# Patient Record
Sex: FEMALE | Race: WHITE | NOT HISPANIC OR LATINO
[De-identification: names, ages, dates, MRNs, and addresses within clinical notes are randomized per-mention and may not be internally consistent; named-entity substitution may affect disease eponyms.]

---

## 2017-01-17 ENCOUNTER — APPOINTMENT (OUTPATIENT)
Dept: ENDOCRINOLOGY | Facility: CLINIC | Age: 52
End: 2017-01-17

## 2017-02-16 ENCOUNTER — MEDICATION RENEWAL (OUTPATIENT)
Age: 52
End: 2017-02-16

## 2017-03-01 ENCOUNTER — MEDICATION RENEWAL (OUTPATIENT)
Age: 52
End: 2017-03-01

## 2017-04-01 ENCOUNTER — TRANSCRIPTION ENCOUNTER (OUTPATIENT)
Age: 52
End: 2017-04-01

## 2017-05-31 ENCOUNTER — MEDICATION RENEWAL (OUTPATIENT)
Age: 52
End: 2017-05-31

## 2017-06-23 ENCOUNTER — APPOINTMENT (OUTPATIENT)
Dept: ENDOCRINOLOGY | Facility: CLINIC | Age: 52
End: 2017-06-23

## 2017-07-21 ENCOUNTER — APPOINTMENT (OUTPATIENT)
Dept: ENDOCRINOLOGY | Facility: CLINIC | Age: 52
End: 2017-07-21

## 2017-10-06 ENCOUNTER — APPOINTMENT (OUTPATIENT)
Dept: ULTRASOUND IMAGING | Facility: CLINIC | Age: 52
End: 2017-10-06
Payer: COMMERCIAL

## 2017-10-06 ENCOUNTER — OUTPATIENT (OUTPATIENT)
Dept: OUTPATIENT SERVICES | Facility: HOSPITAL | Age: 52
LOS: 1 days | End: 2017-10-06
Payer: COMMERCIAL

## 2017-10-06 DIAGNOSIS — Z00.8 ENCOUNTER FOR OTHER GENERAL EXAMINATION: ICD-10-CM

## 2017-10-06 PROCEDURE — 76856 US EXAM PELVIC COMPLETE: CPT

## 2017-10-06 PROCEDURE — 76830 TRANSVAGINAL US NON-OB: CPT | Mod: 26

## 2017-10-06 PROCEDURE — 76856 US EXAM PELVIC COMPLETE: CPT | Mod: 26

## 2017-10-06 PROCEDURE — 76700 US EXAM ABDOM COMPLETE: CPT | Mod: 26

## 2017-10-06 PROCEDURE — 76700 US EXAM ABDOM COMPLETE: CPT

## 2017-10-06 PROCEDURE — 76830 TRANSVAGINAL US NON-OB: CPT

## 2017-10-12 ENCOUNTER — APPOINTMENT (OUTPATIENT)
Dept: ENDOCRINOLOGY | Facility: CLINIC | Age: 52
End: 2017-10-12

## 2017-11-05 ENCOUNTER — EMERGENCY (EMERGENCY)
Facility: HOSPITAL | Age: 52
LOS: 1 days | Discharge: HOME | End: 2017-11-05
Attending: EMERGENCY MEDICINE | Admitting: EMERGENCY MEDICINE
Payer: COMMERCIAL

## 2017-11-05 VITALS
DIASTOLIC BLOOD PRESSURE: 81 MMHG | OXYGEN SATURATION: 100 % | TEMPERATURE: 98 F | RESPIRATION RATE: 18 BRPM | SYSTOLIC BLOOD PRESSURE: 121 MMHG | HEART RATE: 65 BPM

## 2017-11-05 VITALS
DIASTOLIC BLOOD PRESSURE: 61 MMHG | RESPIRATION RATE: 20 BRPM | SYSTOLIC BLOOD PRESSURE: 111 MMHG | TEMPERATURE: 98 F | OXYGEN SATURATION: 100 % | HEART RATE: 74 BPM

## 2017-11-05 DIAGNOSIS — Z98.891 HISTORY OF UTERINE SCAR FROM PREVIOUS SURGERY: Chronic | ICD-10-CM

## 2017-11-05 LAB
ALBUMIN SERPL ELPH-MCNC: 4.5 G/DL — SIGNIFICANT CHANGE UP (ref 3.3–5)
ALP SERPL-CCNC: 75 U/L — SIGNIFICANT CHANGE UP (ref 40–120)
ALT FLD-CCNC: 21 U/L RC — SIGNIFICANT CHANGE UP (ref 10–45)
ANION GAP SERPL CALC-SCNC: 13 MMOL/L — SIGNIFICANT CHANGE UP (ref 5–17)
APPEARANCE UR: CLEAR — SIGNIFICANT CHANGE UP
AST SERPL-CCNC: 19 U/L — SIGNIFICANT CHANGE UP (ref 10–40)
BASOPHILS # BLD AUTO: 0.1 K/UL — SIGNIFICANT CHANGE UP (ref 0–0.2)
BASOPHILS NFR BLD AUTO: 1 % — SIGNIFICANT CHANGE UP (ref 0–2)
BILIRUB SERPL-MCNC: 0.2 MG/DL — SIGNIFICANT CHANGE UP (ref 0.2–1.2)
BILIRUB UR-MCNC: NEGATIVE — SIGNIFICANT CHANGE UP
BUN SERPL-MCNC: 16 MG/DL — SIGNIFICANT CHANGE UP (ref 7–23)
CALCIUM SERPL-MCNC: 9.8 MG/DL — SIGNIFICANT CHANGE UP (ref 8.4–10.5)
CHLORIDE SERPL-SCNC: 103 MMOL/L — SIGNIFICANT CHANGE UP (ref 96–108)
CO2 SERPL-SCNC: 25 MMOL/L — SIGNIFICANT CHANGE UP (ref 22–31)
COLOR SPEC: SIGNIFICANT CHANGE UP
CREAT SERPL-MCNC: 0.78 MG/DL — SIGNIFICANT CHANGE UP (ref 0.5–1.3)
DIFF PNL FLD: NEGATIVE — SIGNIFICANT CHANGE UP
EOSINOPHIL # BLD AUTO: 0.2 K/UL — SIGNIFICANT CHANGE UP (ref 0–0.5)
EOSINOPHIL NFR BLD AUTO: 3 % — SIGNIFICANT CHANGE UP (ref 0–6)
EPI CELLS # UR: SIGNIFICANT CHANGE UP /HPF
GLUCOSE SERPL-MCNC: 103 MG/DL — HIGH (ref 70–99)
GLUCOSE UR QL: NEGATIVE — SIGNIFICANT CHANGE UP
HCT VFR BLD CALC: 39.8 % — SIGNIFICANT CHANGE UP (ref 34.5–45)
HGB BLD-MCNC: 14 G/DL — SIGNIFICANT CHANGE UP (ref 11.5–15.5)
KETONES UR-MCNC: NEGATIVE — SIGNIFICANT CHANGE UP
LEUKOCYTE ESTERASE UR-ACNC: NEGATIVE — SIGNIFICANT CHANGE UP
LYMPHOCYTES # BLD AUTO: 2.4 K/UL — SIGNIFICANT CHANGE UP (ref 1–3.3)
LYMPHOCYTES # BLD AUTO: 34 % — SIGNIFICANT CHANGE UP (ref 13–44)
MCHC RBC-ENTMCNC: 35.2 GM/DL — SIGNIFICANT CHANGE UP (ref 32–36)
MCHC RBC-ENTMCNC: 37.4 PG — HIGH (ref 27–34)
MCV RBC AUTO: 106 FL — HIGH (ref 80–100)
MONOCYTES # BLD AUTO: 0.6 K/UL — SIGNIFICANT CHANGE UP (ref 0–0.9)
MONOCYTES NFR BLD AUTO: 7.8 % — SIGNIFICANT CHANGE UP (ref 2–14)
NEUTROPHILS # BLD AUTO: 3.8 K/UL — SIGNIFICANT CHANGE UP (ref 1.8–7.4)
NEUTROPHILS NFR BLD AUTO: 54.1 % — SIGNIFICANT CHANGE UP (ref 43–77)
NITRITE UR-MCNC: NEGATIVE — SIGNIFICANT CHANGE UP
PH UR: 6.5 — SIGNIFICANT CHANGE UP (ref 5–8)
PLATELET # BLD AUTO: 255 K/UL — SIGNIFICANT CHANGE UP (ref 150–400)
POTASSIUM SERPL-MCNC: 3.9 MMOL/L — SIGNIFICANT CHANGE UP (ref 3.5–5.3)
POTASSIUM SERPL-SCNC: 3.9 MMOL/L — SIGNIFICANT CHANGE UP (ref 3.5–5.3)
PROT SERPL-MCNC: 7.5 G/DL — SIGNIFICANT CHANGE UP (ref 6–8.3)
PROT UR-MCNC: NEGATIVE — SIGNIFICANT CHANGE UP
RBC # BLD: 3.74 M/UL — LOW (ref 3.8–5.2)
RBC # FLD: 10.7 % — SIGNIFICANT CHANGE UP (ref 10.3–14.5)
SODIUM SERPL-SCNC: 141 MMOL/L — SIGNIFICANT CHANGE UP (ref 135–145)
SP GR SPEC: 1.01 — SIGNIFICANT CHANGE UP (ref 1.01–1.02)
UROBILINOGEN FLD QL: NEGATIVE — SIGNIFICANT CHANGE UP
WBC # BLD: 7 K/UL — SIGNIFICANT CHANGE UP (ref 3.8–10.5)
WBC # FLD AUTO: 7 K/UL — SIGNIFICANT CHANGE UP (ref 3.8–10.5)
WBC UR QL: SIGNIFICANT CHANGE UP /HPF (ref 0–5)

## 2017-11-05 PROCEDURE — 80053 COMPREHEN METABOLIC PANEL: CPT

## 2017-11-05 PROCEDURE — 99285 EMERGENCY DEPT VISIT HI MDM: CPT

## 2017-11-05 PROCEDURE — 85027 COMPLETE CBC AUTOMATED: CPT

## 2017-11-05 PROCEDURE — 87086 URINE CULTURE/COLONY COUNT: CPT

## 2017-11-05 PROCEDURE — 99284 EMERGENCY DEPT VISIT MOD MDM: CPT | Mod: 25

## 2017-11-05 PROCEDURE — 74000: CPT

## 2017-11-05 PROCEDURE — 81001 URINALYSIS AUTO W/SCOPE: CPT

## 2017-11-05 PROCEDURE — 76775 US EXAM ABDO BACK WALL LIM: CPT

## 2017-11-05 PROCEDURE — 96374 THER/PROPH/DIAG INJ IV PUSH: CPT

## 2017-11-05 PROCEDURE — 76775 US EXAM ABDO BACK WALL LIM: CPT | Mod: 26

## 2017-11-05 PROCEDURE — 74000: CPT | Mod: 26

## 2017-11-05 RX ORDER — KETOROLAC TROMETHAMINE 30 MG/ML
30 SYRINGE (ML) INJECTION ONCE
Qty: 0 | Refills: 0 | Status: DISCONTINUED | OUTPATIENT
Start: 2017-11-05 | End: 2017-11-05

## 2017-11-05 RX ORDER — SODIUM CHLORIDE 9 MG/ML
2000 INJECTION INTRAMUSCULAR; INTRAVENOUS; SUBCUTANEOUS ONCE
Qty: 0 | Refills: 0 | Status: COMPLETED | OUTPATIENT
Start: 2017-11-05 | End: 2017-11-05

## 2017-11-05 RX ORDER — LEVOTHYROXINE SODIUM 125 MCG
0 TABLET ORAL
Qty: 0 | Refills: 0 | COMMUNITY

## 2017-11-05 RX ORDER — OXYCODONE HYDROCHLORIDE 5 MG/1
1 TABLET ORAL
Qty: 12 | Refills: 0 | OUTPATIENT
Start: 2017-11-05 | End: 2017-11-08

## 2017-11-05 RX ADMIN — Medication 30 MILLIGRAM(S): at 18:35

## 2017-11-05 RX ADMIN — SODIUM CHLORIDE 4000 MILLILITER(S): 9 INJECTION INTRAMUSCULAR; INTRAVENOUS; SUBCUTANEOUS at 18:35

## 2017-11-05 NOTE — ED ADULT NURSE NOTE - OBJECTIVE STATEMENT
51 yr old female with a dx kidney stone came in with flank pain. on assessment a and o x 3 lungs clear abd soft non tender pain on the right side when touched. has thyroid issues but no other issues or medical problems. pt states no painful urination or noticable blood in urine.

## 2017-11-05 NOTE — ED PROVIDER NOTE - PHYSICAL EXAMINATION
A&Ox3 moderate distress and discomfort. L +CVAT, +ttp LLQ. CN 2-12 grossly intact without focal neurologic defict. PERRLA, EOMI. Heart RRR no murmur. No JVD. No peripheral edema. Lungs CTA b/l no wheezes, rhonchi, rales. Peripheral pulses present and equal b/l. Head NCAT. Skin normal for race. A&Ox3 moderate distress and discomfort. + L flank TTP, no abd TTP. CN 2-12 grossly intact without focal neurologic defict. PERRLA, EOMI. Heart RRR no murmur. No JVD. No peripheral edema. Lungs CTA b/l no wheezes, rhonchi, rales. Peripheral pulses present and equal b/l. Head NCAT. Skin normal for race.

## 2017-11-05 NOTE — ED PROVIDER NOTE - CARE PLAN
Principal Discharge DX:	Renal calculi  Instructions for follow-up, activity and diet:	- Take Motrin 600mg every 6 hours with food and Tylenol 1000 mg every 6 hours as needed for pain  - Take oxycodone 5mg every 6 hours with food and stool softener as needed for breakthrough pain  - Use strainer when urinating  - Follow up with your primary care physician tomorrow  - Return to ER if you experience worsening symptoms or high fever Principal Discharge DX:	Left flank pain  Instructions for follow-up, activity and diet:	- Take Motrin 600mg every 6 hours with food and Tylenol 1000 mg every 6 hours as needed for pain  - Take oxycodone 5mg every 6 hours with food and stool softener as needed for breakthrough pain  - Use strainer when urinating  - Follow up with your primary care physician tomorrow  - Return to ER if you experience worsening symptoms or high fever  Secondary Diagnosis:	Renal calculi

## 2017-11-05 NOTE — ED PROVIDER NOTE - OBJECTIVE STATEMENT
52 yo F w/known h/o kidney stone found 1 month ago on U/S to investigate abdominal pain and distention presents with 1 day L flank pain radiating to groin. Pt reports pain is intermittent, 10/10. Denies fever, chills, N/V/D, blood in stool or urine, SOB, CP. 52 yo F w/known h/o kidney stone found 1 month ago on U/S to investigate abdominal pain and distention presents with 1 day L flank pain radiating to L abdomen. Pt reports pain is intermittent, 10/10 sharp stabbing pain. Denies fever, chills, N/V/D, blood in stool or urine, SOB, CP.

## 2017-11-05 NOTE — ED PROVIDER NOTE - PLAN OF CARE
- Take Motrin 600mg every 6 hours with food and Tylenol 1000 mg every 6 hours as needed for pain  - Take oxycodone 5mg every 6 hours with food and stool softener as needed for breakthrough pain  - Use strainer when urinating  - Follow up with your primary care physician tomorrow  - Return to ER if you experience worsening symptoms or high fever

## 2017-11-05 NOTE — ED PROVIDER NOTE - ATTENDING CONTRIBUTION TO CARE
52 yo F presents with L flank pain since 2pm today. Constant pain that intermittently becomes sharp stabbing 8/10 pain. Raditing to L abdomen recently. No urinary complaints. She has a known 3mm L intrarenal stone identified on US few weeks ago. No fevers. NO N/V/D.  PE with L flank TTP.   Vital signs normal.   ED workup reveals 50 yo F presents with L flank pain since 2pm today. Constant pain that intermittently becomes sharp stabbing 8/10 pain. Raditing to L abdomen recently. No urinary complaints. She has a known 3mm L intrarenal stone identified on US few weeks ago. No fevers. NO N/V/D.  PE with L flank TTP. no abdominal or pelvic TTP. VS normal.     patient declined all CT imaging, as she has already received several  CT scans and was concerned about radiation exposure.  Patient is AAOX3,  as full classes make all medical decisions, expresses full understanding that without  CT imaging, definitive diagnosis cannot be made, potentially  resulting in a failure to diagnose a life-threatening illness that could result in significant disability and death.  ED workup reveals  Normal labs, including normal renal function and no  leukocytosis. Urinalysis with no evidence of infection nor any blood. Ultrasound renal with no evidence of hydronephrosis. X-ray of the abdomen with nonspecific bowel air but no radiopaque stones seen.    patient was treated with IV fluids and pain meds in that year. Evaluation she denied any. She was observed eating and drinking room without complaints. Given her recent diagnosis of left 3 mm intrarenal stone, and her complaints of colicky left flank pain, I do suspect the patient 's  pain is due to ureteral stone.  therefore, patient was discharged with instructions to rest, drink plenty of goods, Motrin and Tylenol for pain, Percocet for severe pain, flomax daily, and follow up with her primary care doctor and urologist wants to tease for repeat evaluation and  further management    The patient was discharged from the ED in stable condition. All results of today's workup were discussed with the patient and all questions/concerns were addressed. All discharge instructions were thoroughly discussed with the patient, as well as important warning signs and new/ worsening symptoms which should necessitate patient's immediate return to the ED. The patient is agreeable with discharge and expresses full understanding of all instructions given.

## 2017-11-05 NOTE — ED PROVIDER NOTE - CHPI ED SYMPTOMS NEG
no fever/no blood in stool/no chills/no abdominal distension/no diarrhea/no hematuria/no nausea/no vomiting/no burning urination

## 2017-11-05 NOTE — ED ADULT NURSE REASSESSMENT NOTE - NS ED NURSE REASSESS COMMENT FT1
Report received from Shawanda Topete RN. Patient A&Ox3, neuro intact, VSS. Family at bedside. US and Xray obtained. Patient reports pain improvement after toradol administration by dayshift RN. Patient Report received from Shawanda Topete RN. Patient A&Ox3, neuro intact, VSS. Family at bedside. US and Xray obtained. Patient reports pain improvement after toradol administration by dayshift RN. Patient discharged home with copy of results to follow up with PMD. Urine strainers provided.

## 2017-11-06 LAB
CULTURE RESULTS: SIGNIFICANT CHANGE UP
SPECIMEN SOURCE: SIGNIFICANT CHANGE UP

## 2018-09-09 ENCOUNTER — TRANSCRIPTION ENCOUNTER (OUTPATIENT)
Age: 53
End: 2018-09-09

## 2018-09-10 PROBLEM — E03.9 HYPOTHYROIDISM, UNSPECIFIED: Chronic | Status: ACTIVE | Noted: 2017-11-05

## 2018-10-16 ENCOUNTER — APPOINTMENT (OUTPATIENT)
Dept: ENDOCRINOLOGY | Facility: CLINIC | Age: 53
End: 2018-10-16

## 2019-02-02 ENCOUNTER — TRANSCRIPTION ENCOUNTER (OUTPATIENT)
Age: 54
End: 2019-02-02

## 2019-05-06 ENCOUNTER — TRANSCRIPTION ENCOUNTER (OUTPATIENT)
Age: 54
End: 2019-05-06

## 2019-07-15 ENCOUNTER — APPOINTMENT (OUTPATIENT)
Dept: DERMATOLOGY | Facility: CLINIC | Age: 54
End: 2019-07-15
Payer: COMMERCIAL

## 2019-07-15 VITALS — HEIGHT: 67 IN | BODY MASS INDEX: 24.33 KG/M2 | WEIGHT: 155 LBS

## 2019-07-15 PROCEDURE — 11900 INJECT SKIN LESIONS </W 7: CPT

## 2019-07-15 PROCEDURE — 99203 OFFICE O/P NEW LOW 30 MIN: CPT | Mod: 25

## 2019-07-15 NOTE — ASSESSMENT
[FreeTextEntry1] : 1) androgenetic alopecia-\par -education\par -tx options discussed\par -rogaine vs. PRP\par -patient to try rogaine; SED\par \par 2) Alopecia areata-\par -education\par -prognosis and tx options discussed\par - ILK 5 to area; SED\par \par Risks and benefits were discussed including including atrophy, discoloration\par Intralesional triamcinolone, concentration 5  mg/cc;\par Total volume  0.3    cc\par \par Sites: 1\par

## 2019-07-15 NOTE — PHYSICAL EXAM
[FreeTextEntry3] : AAOx3, pleasant, NAD, no visual lymphadenopathy\par hair, scalp, face, nose, eyelids, ears, lips, oropharynx, neck, chest, abdomen, back, right arm, left arm, nails, and hands examined with all normal findings,\par pertinent findings include:\par \par mild thinning of hair on frontal scalp\par circular patch of hair loss with fine white hairs on left mid scalp

## 2019-07-15 NOTE — HISTORY OF PRESENT ILLNESS
[FreeTextEntry1] : hair loss [de-identified] : 53 year old female with h/o of Hashimotos Thyroid here with hair loss on left mid scalp. Was noticed by . Not itchy. Under stress as work ([employer] ). \par \par Also with thinning of hair on frontal scalp.

## 2019-08-26 ENCOUNTER — APPOINTMENT (OUTPATIENT)
Dept: DERMATOLOGY | Facility: CLINIC | Age: 54
End: 2019-08-26
Payer: COMMERCIAL

## 2019-08-26 PROCEDURE — 11900 INJECT SKIN LESIONS </W 7: CPT

## 2019-08-26 PROCEDURE — 99213 OFFICE O/P EST LOW 20 MIN: CPT | Mod: 25

## 2019-10-01 ENCOUNTER — TRANSCRIPTION ENCOUNTER (OUTPATIENT)
Age: 54
End: 2019-10-01

## 2019-10-02 ENCOUNTER — APPOINTMENT (OUTPATIENT)
Dept: DERMATOLOGY | Facility: CLINIC | Age: 54
End: 2019-10-02
Payer: COMMERCIAL

## 2019-10-02 PROCEDURE — 99213 OFFICE O/P EST LOW 20 MIN: CPT | Mod: 25

## 2019-10-02 PROCEDURE — 11900 INJECT SKIN LESIONS </W 7: CPT | Mod: 59

## 2019-10-02 PROCEDURE — 17110 DESTRUCTION B9 LES UP TO 14: CPT | Mod: 59

## 2019-10-02 NOTE — HISTORY OF PRESENT ILLNESS
[FreeTextEntry1] : hair loss [de-identified] : 53 year old female with h/o of Hashimotos Thyroid here with hair loss on left mid scalp. Was noticed by . Not itchy. Under stress as work ([employer] ). Has had 2 round of ILK. Feels she is spreading. Also has brown plaque on mid scalp. Growing.\par \par Also with thinning of hair on frontal scalp.

## 2019-10-02 NOTE — ASSESSMENT
[FreeTextEntry1] : 1) androgenetic alopecia-\par -education\par -tx options discussed\par -rogaine vs. PRP\par -patient defers rogaine\par \par 2) Alopecia areata-\par -education\par -prognosis and tx options discussed\par - ILK 5 to area; SED\par \par Risks and benefits were discussed including including atrophy, discoloration\par Intralesional triamcinolone, concentration 5  mg/cc;\par Total volume  0.3    cc\par \par Sites: 2\par \par 3) rash- resolving\par \par 4) ISK-\par The specified lesions were treated with liquid nitrogen cryotherapy.  Discussed risks including pain, blistering, crusting, discoloration, recurrence.\par

## 2019-10-02 NOTE — PHYSICAL EXAM
[FreeTextEntry3] : AAOx3, pleasant, NAD, no visual lymphadenopathy\par hair, scalp, face, nose, eyelids, ears, lips, oropharynx, neck, chest, abdomen, back, right arm, left arm, nails, and hands examined with all normal findings,\par pertinent findings include:\par \par mild thinning of hair on frontal scalp\par circular patch of hair loss with fine white hairs on left mid scalp\par + inflamed, waxy, keratotic papule on mid scalp\par resolving erythema on scalp

## 2019-10-07 RX ORDER — CLOBETASOL PROPIONATE 0.5 MG/ML
0.05 SOLUTION TOPICAL
Qty: 1 | Refills: 1 | Status: ACTIVE | COMMUNITY
Start: 2019-10-07 | End: 1900-01-01

## 2019-10-17 ENCOUNTER — APPOINTMENT (OUTPATIENT)
Dept: DERMATOLOGY | Facility: CLINIC | Age: 54
End: 2019-10-17
Payer: COMMERCIAL

## 2019-10-17 DIAGNOSIS — L82.0 INFLAMED SEBORRHEIC KERATOSIS: ICD-10-CM

## 2019-10-17 PROCEDURE — 99213 OFFICE O/P EST LOW 20 MIN: CPT | Mod: 25

## 2019-10-17 PROCEDURE — 11900 INJECT SKIN LESIONS </W 7: CPT

## 2019-11-18 ENCOUNTER — TRANSCRIPTION ENCOUNTER (OUTPATIENT)
Age: 54
End: 2019-11-18

## 2019-12-05 ENCOUNTER — APPOINTMENT (OUTPATIENT)
Dept: DERMATOLOGY | Facility: CLINIC | Age: 54
End: 2019-12-05

## 2019-12-07 ENCOUNTER — TRANSCRIPTION ENCOUNTER (OUTPATIENT)
Age: 54
End: 2019-12-07

## 2019-12-10 ENCOUNTER — APPOINTMENT (OUTPATIENT)
Dept: DERMATOLOGY | Facility: CLINIC | Age: 54
End: 2019-12-10
Payer: COMMERCIAL

## 2019-12-10 DIAGNOSIS — L64.9 ANDROGENIC ALOPECIA, UNSPECIFIED: ICD-10-CM

## 2019-12-10 PROCEDURE — 99213 OFFICE O/P EST LOW 20 MIN: CPT | Mod: 25

## 2019-12-10 PROCEDURE — 11900 INJECT SKIN LESIONS </W 7: CPT

## 2020-02-18 ENCOUNTER — APPOINTMENT (OUTPATIENT)
Dept: DERMATOLOGY | Facility: CLINIC | Age: 55
End: 2020-02-18
Payer: COMMERCIAL

## 2020-02-18 DIAGNOSIS — L63.9 ALOPECIA AREATA, UNSPECIFIED: ICD-10-CM

## 2020-02-18 DIAGNOSIS — R21 RASH AND OTHER NONSPECIFIC SKIN ERUPTION: ICD-10-CM

## 2020-02-18 PROCEDURE — 99213 OFFICE O/P EST LOW 20 MIN: CPT | Mod: 25

## 2020-02-18 PROCEDURE — 11900 INJECT SKIN LESIONS </W 7: CPT

## 2020-03-18 ENCOUNTER — APPOINTMENT (OUTPATIENT)
Dept: DERMATOLOGY | Facility: CLINIC | Age: 55
End: 2020-03-18

## 2020-06-01 DIAGNOSIS — Z20.828 CONTACT WITH AND (SUSPECTED) EXPOSURE TO OTHER VIRAL COMMUNICABLE DISEASES: ICD-10-CM

## 2020-06-28 ENCOUNTER — TRANSCRIPTION ENCOUNTER (OUTPATIENT)
Age: 55
End: 2020-06-28

## 2020-12-30 ENCOUNTER — APPOINTMENT (OUTPATIENT)
Dept: ULTRASOUND IMAGING | Facility: HOSPITAL | Age: 55
End: 2020-12-30

## 2020-12-30 ENCOUNTER — APPOINTMENT (OUTPATIENT)
Dept: MAMMOGRAPHY | Facility: HOSPITAL | Age: 55
End: 2020-12-30

## 2021-02-10 ENCOUNTER — TRANSCRIPTION ENCOUNTER (OUTPATIENT)
Age: 56
End: 2021-02-10

## 2021-05-04 ENCOUNTER — APPOINTMENT (OUTPATIENT)
Dept: MAMMOGRAPHY | Facility: CLINIC | Age: 56
End: 2021-05-04
Payer: COMMERCIAL

## 2021-05-04 ENCOUNTER — APPOINTMENT (OUTPATIENT)
Dept: ULTRASOUND IMAGING | Facility: CLINIC | Age: 56
End: 2021-05-04
Payer: COMMERCIAL

## 2021-05-04 PROCEDURE — 77067 SCR MAMMO BI INCL CAD: CPT

## 2021-05-04 PROCEDURE — 77063 BREAST TOMOSYNTHESIS BI: CPT

## 2021-05-04 PROCEDURE — 76641 ULTRASOUND BREAST COMPLETE: CPT | Mod: 50

## 2021-05-11 ENCOUNTER — APPOINTMENT (OUTPATIENT)
Dept: ULTRASOUND IMAGING | Facility: CLINIC | Age: 56
End: 2021-05-11
Payer: COMMERCIAL

## 2021-05-11 ENCOUNTER — APPOINTMENT (OUTPATIENT)
Dept: MAMMOGRAPHY | Facility: CLINIC | Age: 56
End: 2021-05-11
Payer: COMMERCIAL

## 2021-05-11 ENCOUNTER — TRANSCRIPTION ENCOUNTER (OUTPATIENT)
Age: 56
End: 2021-05-11

## 2021-05-11 PROCEDURE — 77065 DX MAMMO INCL CAD UNI: CPT | Mod: RT

## 2021-05-11 PROCEDURE — G0279: CPT | Mod: RT

## 2021-05-11 PROCEDURE — 76642 ULTRASOUND BREAST LIMITED: CPT | Mod: RT

## 2021-12-08 ENCOUNTER — TRANSCRIPTION ENCOUNTER (OUTPATIENT)
Age: 56
End: 2021-12-08

## 2022-01-22 ENCOUNTER — TRANSCRIPTION ENCOUNTER (OUTPATIENT)
Age: 57
End: 2022-01-22

## 2022-02-14 ENCOUNTER — TRANSCRIPTION ENCOUNTER (OUTPATIENT)
Age: 57
End: 2022-02-14

## 2022-10-03 ENCOUNTER — APPOINTMENT (OUTPATIENT)
Dept: ULTRASOUND IMAGING | Facility: CLINIC | Age: 57
End: 2022-10-03

## 2022-10-03 ENCOUNTER — OUTPATIENT (OUTPATIENT)
Dept: OUTPATIENT SERVICES | Facility: HOSPITAL | Age: 57
LOS: 1 days | End: 2022-10-03
Payer: COMMERCIAL

## 2022-10-03 DIAGNOSIS — R10.2 PELVIC AND PERINEAL PAIN: ICD-10-CM

## 2022-10-03 DIAGNOSIS — Z98.891 HISTORY OF UTERINE SCAR FROM PREVIOUS SURGERY: Chronic | ICD-10-CM

## 2022-10-03 DIAGNOSIS — R93.9 DIAGNOSTIC IMAGING INCONCLUSIVE DUE TO EXCESS BODY FAT OF PATIENT: ICD-10-CM

## 2022-10-03 PROCEDURE — 76770 US EXAM ABDO BACK WALL COMP: CPT

## 2022-10-03 PROCEDURE — 76830 TRANSVAGINAL US NON-OB: CPT

## 2022-10-03 PROCEDURE — 76770 US EXAM ABDO BACK WALL COMP: CPT | Mod: 26

## 2022-10-03 PROCEDURE — 76856 US EXAM PELVIC COMPLETE: CPT

## 2022-10-03 PROCEDURE — 76830 TRANSVAGINAL US NON-OB: CPT | Mod: 26

## 2022-10-03 PROCEDURE — 76856 US EXAM PELVIC COMPLETE: CPT | Mod: 26

## 2023-09-24 ENCOUNTER — NON-APPOINTMENT (OUTPATIENT)
Age: 58
End: 2023-09-24

## 2023-10-23 ENCOUNTER — APPOINTMENT (OUTPATIENT)
Dept: ULTRASOUND IMAGING | Facility: CLINIC | Age: 58
End: 2023-10-23
Payer: COMMERCIAL

## 2023-10-23 ENCOUNTER — OUTPATIENT (OUTPATIENT)
Dept: OUTPATIENT SERVICES | Facility: HOSPITAL | Age: 58
LOS: 1 days | End: 2023-10-23
Payer: COMMERCIAL

## 2023-10-23 DIAGNOSIS — Z98.891 HISTORY OF UTERINE SCAR FROM PREVIOUS SURGERY: Chronic | ICD-10-CM

## 2023-10-23 DIAGNOSIS — R10.2 PELVIC AND PERINEAL PAIN: ICD-10-CM

## 2023-10-23 PROCEDURE — 76830 TRANSVAGINAL US NON-OB: CPT

## 2023-10-23 PROCEDURE — 76856 US EXAM PELVIC COMPLETE: CPT | Mod: 26

## 2023-10-23 PROCEDURE — 76830 TRANSVAGINAL US NON-OB: CPT | Mod: 26

## 2023-10-23 PROCEDURE — 76856 US EXAM PELVIC COMPLETE: CPT

## 2023-11-03 ENCOUNTER — APPOINTMENT (OUTPATIENT)
Dept: ULTRASOUND IMAGING | Facility: CLINIC | Age: 58
End: 2023-11-03
Payer: COMMERCIAL

## 2023-11-03 PROCEDURE — 76770 US EXAM ABDO BACK WALL COMP: CPT

## 2024-01-28 ENCOUNTER — NON-APPOINTMENT (OUTPATIENT)
Age: 59
End: 2024-01-28

## 2024-05-14 ENCOUNTER — APPOINTMENT (OUTPATIENT)
Dept: MAMMOGRAPHY | Facility: CLINIC | Age: 59
End: 2024-05-14
Payer: COMMERCIAL

## 2024-05-14 ENCOUNTER — APPOINTMENT (OUTPATIENT)
Dept: ULTRASOUND IMAGING | Facility: CLINIC | Age: 59
End: 2024-05-14
Payer: COMMERCIAL

## 2024-05-14 PROCEDURE — 77067 SCR MAMMO BI INCL CAD: CPT

## 2024-05-14 PROCEDURE — 77063 BREAST TOMOSYNTHESIS BI: CPT

## 2024-05-14 PROCEDURE — 76641 ULTRASOUND BREAST COMPLETE: CPT | Mod: 50

## 2024-05-16 ENCOUNTER — APPOINTMENT (OUTPATIENT)
Dept: MAMMOGRAPHY | Facility: CLINIC | Age: 59
End: 2024-05-16
Payer: COMMERCIAL

## 2024-05-16 PROCEDURE — G0279: CPT | Mod: RT

## 2024-05-16 PROCEDURE — 77065 DX MAMMO INCL CAD UNI: CPT | Mod: RT

## 2024-05-20 ENCOUNTER — APPOINTMENT (OUTPATIENT)
Dept: MAMMOGRAPHY | Facility: CLINIC | Age: 59
End: 2024-05-20
Payer: COMMERCIAL

## 2024-05-20 PROCEDURE — 77065 DX MAMMO INCL CAD UNI: CPT | Mod: RT

## 2024-05-20 PROCEDURE — 19081 BX BREAST 1ST LESION STRTCTC: CPT | Mod: RT

## 2024-05-20 PROCEDURE — A4648: CPT

## 2024-05-23 ENCOUNTER — NON-APPOINTMENT (OUTPATIENT)
Age: 59
End: 2024-05-23

## 2024-06-10 ENCOUNTER — APPOINTMENT (OUTPATIENT)
Dept: RADIOLOGY | Facility: CLINIC | Age: 59
End: 2024-06-10

## 2024-06-14 ENCOUNTER — APPOINTMENT (OUTPATIENT)
Dept: MRI IMAGING | Facility: CLINIC | Age: 59
End: 2024-06-14
Payer: COMMERCIAL

## 2024-06-14 PROBLEM — R92.0 ABNORMAL MAMMOGRAM WITH MICROCALCIFICATION: Status: ACTIVE | Noted: 2024-06-14

## 2024-06-14 PROBLEM — N60.91 ATYPICAL DUCTAL HYPERPLASIA OF RIGHT BREAST: Status: ACTIVE | Noted: 2024-06-14

## 2024-06-14 PROBLEM — Z80.3 FAMILY HISTORY OF BREAST CANCER: Status: ACTIVE | Noted: 2024-06-14

## 2024-06-14 PROCEDURE — A9585: CPT | Mod: JW

## 2024-06-14 PROCEDURE — 77049 MRI BREAST C-+ W/CAD BI: CPT

## 2024-06-17 ENCOUNTER — APPOINTMENT (OUTPATIENT)
Dept: PLASTIC SURGERY | Facility: CLINIC | Age: 59
End: 2024-06-17
Payer: COMMERCIAL

## 2024-06-17 VITALS
DIASTOLIC BLOOD PRESSURE: 74 MMHG | TEMPERATURE: 98.2 F | OXYGEN SATURATION: 98 % | WEIGHT: 155 LBS | HEART RATE: 84 BPM | HEIGHT: 67 IN | BODY MASS INDEX: 24.33 KG/M2 | SYSTOLIC BLOOD PRESSURE: 111 MMHG

## 2024-06-17 DIAGNOSIS — R92.1 MAMMOGRAPHIC CALCIFICATION FOUND ON DIAGNOSTIC IMAGING OF BREAST: ICD-10-CM

## 2024-06-17 DIAGNOSIS — Z80.3 FAMILY HISTORY OF MALIGNANT NEOPLASM OF BREAST: ICD-10-CM

## 2024-06-17 DIAGNOSIS — R92.0 MAMMOGRAPHIC MICROCALCIFICATION FOUND ON DIAGNOSTIC IMAGING OF BREAST: ICD-10-CM

## 2024-06-17 DIAGNOSIS — N60.91 UNSPECIFIED BENIGN MAMMARY DYSPLASIA OF RIGHT BREAST: ICD-10-CM

## 2024-06-17 PROCEDURE — 99244 OFF/OP CNSLTJ NEW/EST MOD 40: CPT

## 2024-06-17 NOTE — REVIEW OF SYSTEMS
[Negative] : Heme/Lymph [FreeTextEntry2] : lost 25 lbs in 8 months [FreeTextEntry5] : Heart murmur, mitral valve prolapse [FreeTextEntry6] : Asthma [FreeTextEntry7] : Diverticulosis (colectomy 2020) [de-identified] : Migraines in the past. [de-identified] : Hashimoto

## 2024-06-17 NOTE — HISTORY OF PRESENT ILLNESS
[FreeTextEntry1] : 58 year old female here for a consultation regarding newly biopsied right breast ADH.  She has a history of Hashimoto's thyroiditis, colon polyps and diverticulitis for which she is status post a colon resection.  She said recently she had a 25 pound weight loss which prompted a workup by her primary care physician, Dr. Owen which reportedly consisted of colonoscopy, EGD, CT scan of her lungs and breast imaging. She has a family history of breast cancer in her maternal grandmother (late 50's), paternal grandmother (40's), 2 paternal aunts (40's and 50's) and her maternal aunt (50's). No known family history of ovarian cancer.  She states her sister underwent genetic testing which was reportedly negative.  Her mother  of colon cancer and her father  of gastric cancer. 2024 Bilateral mammogram; Tyrer-zick Lifetime Risk: 10.6%. The breasts are heterogeneously dense. No suspicious mass, suspicious microcalcifications, or other sign of malignancy is identified in the left breast. Indeterminate calcifications in the inner and outer right breast. BREAST ARTERIAL CALCIFICATION (GERI): Grade 0 - No vascular calcifications. Note: The absence of breast arterial calcification does not exclude cardiovascular disease. Management of cardiovascular risk factors should be based clinically. Bilateral ultrasound: Multiple right cysts measuring up to 0.7 cm at 1:00, 3 cm from the nipple. BI-RADS 0, advise additional mammographic views of the right breast.  2024 Right diagnostic mammogram: There is grouping of amorphous calcifications in the upper outer right breast for which stereotactic core needle biopsy is recommended. Additional small grouping of calcifications only seen on spot magnification ML view in the posterior upper right breast, management pending pathology results. Additional probably benign grouping of calcification in the central/slightly upper and inner right breast, most of which demonstrating layering on lateral view. A six-month follow-up diagnostic mammogram is recommended. BI-RADS 4B 2024 Right upper outer stereotactic core biopsy with cork-shaped clip placement:  Pathology revealed ADH associated with microcalcifications.  Fibrocystic disease including usual ductal hyperplasia, microcysts change, apocrine metaplasia, adenosis, and stromal fibrosis associated with epithelial microcalcifications. These results are concordant for which surgical management/surgical excision is recommended.  Please note there is additional grouping of calcifications in the upper outer right breast approximately 2 cm posterior and 2 cm lateral to above biopsy site, this can be further evaluated with stereotactic core needle biopsy versus wide surgical excision.  Additional grouping of calcifications in the central inner right breast some of which demonstrates layering as seen on 2024.  However, in light of above high risk concordant biopsy site, these calcifications remain indeterminant and, if altering surgical management, these can be further evaluated with stereotactic core needle biopsy. 2024 Bilateral breast MRI: The cork clip is identified in the upper outer right breast at the site of biopsy-proven atypical ductal hyperplasia.  Just medial and superior to the clip 1.3 cm postprocedural hematoma is noted.  Minimal biopsy site enhancement/T2 hyperintense signal is noted consistent with postprocedural changes. Left breast: No suspicious enhancement There is no significant axillary or internal mammary lymphadenopathy. Impression: Continued surgical management of right breast atypical ductal hyperplasia advised. FAITH lifetime risk: 28.8% She is here by herself.

## 2024-06-17 NOTE — CONSULT LETTER
[Dear  ___] : Dear  [unfilled], [Consult Letter:] : I had the pleasure of evaluating your patient, [unfilled]. [Please see my note below.] : Please see my note below. [Consult Closing:] : Thank you very much for allowing me to participate in the care of this patient.  If you have any questions, please do not hesitate to contact me. [Sincerely,] : Sincerely, [FreeTextEntry3] : Susan M. Palleschi, MD, FACS Division of Breast Surgery Director, Breast Surgery [address] [address] (Phone) [phone] (Fax) [phone]

## 2024-06-17 NOTE — PHYSICAL EXAM
[Normocephalic] : normocephalic [Atraumatic] : atraumatic [EOMI] : extra ocular movement intact [PERRL] : pupils equal, round and reactive to light [Sclera nonicteric] : sclera nonicteric [Supple] : supple [No Supraclavicular Adenopathy] : no supraclavicular adenopathy [No Cervical Adenopathy] : no cervical adenopathy [No Thyromegaly] : no thyromegaly [Clear to Auscultation Bilat] : clear to auscultation bilaterally [Normal Sinus Rhythm] : normal sinus rhythm [Normal S1, S2] : normal S1 and S2 [No Gallops] : no gallops [No Rubs] : no pericardial rub [Examined in the supine and seated position] : examined in the supine and seated position [Bra Size: ___] : Bra Size: [unfilled] [No dominant masses] : no dominant masses in right breast  [No dominant masses] : no dominant masses left breast [No Nipple Retraction] : no left nipple retraction [No Nipple Discharge] : no left nipple discharge [No Axillary Lymphadenopathy] : no left axillary lymphadenopathy [No Edema] : no edema [No Rashes] : no rashes [No Ulceration] : no ulceration [Asymmetrical] : asymmetrical [de-identified] : Her right breast is larger than her left [de-identified] : palpable mass right 10:30 (N4.5cm) 2.1 x 1.7 cm, consistent with a post biopsy hematoma.

## 2024-06-17 NOTE — ASSESSMENT
[FreeTextEntry1] : Right breast atypical ductal hyperplasia (ADH). I discussed with her that ADH is a risk factor for the presence of breast cancer now, as well as a risk factor for the presence of breast cancer in her future. There is a second grouping of calcifications on mammogram of the right breast.  I spoke with the radiologist who recommends a second right stereotactic biopsy.  1.  Advise a right stereotactic biopsy of the second grouping of calcifications.  I will ask the breast interventional coordinators to contact her to schedule this appointment. 2. Thereafter she will be scheduled for surgery. I advise her to undergo a right Bere  localization excisional breast biopsy to rule out a potential associated malignancy or further atypia. The procedure was reviewed with her in detail. The indications, alternatives, benefits and risks were discussed with her, including but not limited to bleeding, infection, pain, scar, swelling, numbness, change in breast appearance, and the potential need for additional surgery and further treatment. The breast biopsy and Bere  booklets and post operative instructions were reviewed and provided to the patient. 3. Bilateral breast MRI with IV contrast: done 4. Medical oncologist: I discussed with the patient that I will refer her to a medical oncologist of the [facility] program postoperatively to discuss taking an anti-hormonal medication such as Tamoxifen to reduce her potential risk of breast cancer in her future. 4. Annual bilateral mammogram and breast ultrasound due 5/2025

## 2024-07-08 ENCOUNTER — APPOINTMENT (OUTPATIENT)
Dept: MAMMOGRAPHY | Facility: CLINIC | Age: 59
End: 2024-07-08

## 2024-07-09 ENCOUNTER — NON-APPOINTMENT (OUTPATIENT)
Age: 59
End: 2024-07-09

## 2024-07-10 ENCOUNTER — NON-APPOINTMENT (OUTPATIENT)
Age: 59
End: 2024-07-10

## 2024-09-27 ENCOUNTER — APPOINTMENT (OUTPATIENT)
Dept: ENDOCRINOLOGY | Facility: CLINIC | Age: 59
End: 2024-09-27

## 2025-03-31 ENCOUNTER — NON-APPOINTMENT (OUTPATIENT)
Age: 60
End: 2025-03-31